# Patient Record
Sex: FEMALE
[De-identification: names, ages, dates, MRNs, and addresses within clinical notes are randomized per-mention and may not be internally consistent; named-entity substitution may affect disease eponyms.]

---

## 2017-12-20 PROBLEM — Z00.00 ENCOUNTER FOR PREVENTIVE HEALTH EXAMINATION: Status: ACTIVE | Noted: 2017-12-20

## 2018-07-26 PROBLEM — Z87.81 HISTORY OF FRACTURE OF WRIST: Status: RESOLVED | Noted: 2018-07-26 | Resolved: 2018-07-26

## 2018-07-26 PROBLEM — Z86.69 HISTORY OF BELL'S PALSY: Status: RESOLVED | Noted: 2018-07-26 | Resolved: 2018-07-26

## 2018-07-26 PROBLEM — M85.80 OSTEOPENIA: Status: ACTIVE | Noted: 2018-07-26

## 2018-07-26 PROBLEM — F40.00 HISTORY OF AGORAPHOBIA: Status: ACTIVE | Noted: 2018-07-26

## 2018-07-26 PROBLEM — E03.9 HYPOTHYROIDISM: Status: ACTIVE | Noted: 2018-07-26

## 2018-07-26 PROBLEM — F80.81 STUTTERING: Status: ACTIVE | Noted: 2018-07-26

## 2018-07-26 PROBLEM — E78.5 HYPERLIPIDEMIA: Status: ACTIVE | Noted: 2018-07-26

## 2018-07-26 PROBLEM — Z83.79 FAMILY HISTORY OF GASTROESOPHAGEAL REFLUX DISEASE: Status: ACTIVE | Noted: 2018-07-26

## 2018-07-26 PROBLEM — F25.9 SCHIZOAFFECTIVE DISORDER: Status: ACTIVE | Noted: 2018-07-26

## 2018-07-26 PROBLEM — Z82.3 FAMILY HISTORY OF CEREBROVASCULAR ACCIDENT (CVA): Status: ACTIVE | Noted: 2018-07-26

## 2018-07-26 PROBLEM — Z82.5 FAMILY HISTORY OF ASTHMA: Status: ACTIVE | Noted: 2018-07-26

## 2018-07-30 ENCOUNTER — APPOINTMENT (OUTPATIENT)
Dept: GASTROENTEROLOGY | Facility: CLINIC | Age: 68
End: 2018-07-30
Payer: MEDICARE

## 2018-07-30 VITALS
OXYGEN SATURATION: 98 % | WEIGHT: 138.5 LBS | BODY MASS INDEX: 23.64 KG/M2 | SYSTOLIC BLOOD PRESSURE: 102 MMHG | HEART RATE: 71 BPM | HEIGHT: 64 IN | DIASTOLIC BLOOD PRESSURE: 70 MMHG | TEMPERATURE: 97.7 F

## 2018-07-30 DIAGNOSIS — E78.5 HYPERLIPIDEMIA, UNSPECIFIED: ICD-10-CM

## 2018-07-30 DIAGNOSIS — M85.80 OTHER SPECIFIED DISORDERS OF BONE DENSITY AND STRUCTURE, UNSPECIFIED SITE: ICD-10-CM

## 2018-07-30 DIAGNOSIS — Z78.9 OTHER SPECIFIED HEALTH STATUS: ICD-10-CM

## 2018-07-30 DIAGNOSIS — Z12.11 ENCOUNTER FOR SCREENING FOR MALIGNANT NEOPLASM OF COLON: ICD-10-CM

## 2018-07-30 DIAGNOSIS — Z86.69 PERSONAL HISTORY OF OTHER DISEASES OF THE NERVOUS SYSTEM AND SENSE ORGANS: ICD-10-CM

## 2018-07-30 DIAGNOSIS — F40.00 AGORAPHOBIA, UNSPECIFIED: ICD-10-CM

## 2018-07-30 DIAGNOSIS — Z82.5 FAMILY HISTORY OF ASTHMA AND OTHER CHRONIC LOWER RESPIRATORY DISEASES: ICD-10-CM

## 2018-07-30 DIAGNOSIS — Z83.79 FAMILY HISTORY OF OTHER DISEASES OF THE DIGESTIVE SYSTEM: ICD-10-CM

## 2018-07-30 DIAGNOSIS — F25.9 SCHIZOAFFECTIVE DISORDER, UNSPECIFIED: ICD-10-CM

## 2018-07-30 DIAGNOSIS — E03.9 HYPOTHYROIDISM, UNSPECIFIED: ICD-10-CM

## 2018-07-30 DIAGNOSIS — Z87.81 PERSONAL HISTORY OF (HEALED) TRAUMATIC FRACTURE: ICD-10-CM

## 2018-07-30 DIAGNOSIS — Z82.3 FAMILY HISTORY OF STROKE: ICD-10-CM

## 2018-07-30 DIAGNOSIS — F80.81 CHILDHOOD ONSET FLUENCY DISORDER: ICD-10-CM

## 2018-07-30 PROCEDURE — 99214 OFFICE O/P EST MOD 30 MIN: CPT

## 2018-07-30 RX ORDER — PRAVASTATIN SODIUM 40 MG/1
40 TABLET ORAL DAILY
Refills: 0 | Status: ACTIVE | COMMUNITY

## 2018-07-30 RX ORDER — FLUPHENAZINE HYDROCHLORIDE 2.5 MG/1
2.5 TABLET, FILM COATED ORAL DAILY
Refills: 0 | Status: ACTIVE | COMMUNITY

## 2018-07-30 RX ORDER — LACTOBACILLUS RHAMNOSUS GG 10B CELL
CAPSULE ORAL DAILY
Refills: 0 | Status: ACTIVE | COMMUNITY

## 2018-07-30 RX ORDER — LEVOTHYROXINE SODIUM 0.07 MG/1
75 TABLET ORAL DAILY
Refills: 0 | Status: ACTIVE | COMMUNITY

## 2018-07-30 RX ORDER — BENZTROPINE MESYLATE 1 MG/1
1 TABLET ORAL DAILY
Refills: 0 | Status: ACTIVE | COMMUNITY

## 2018-07-30 RX ORDER — FLUOXETINE HYDROCHLORIDE 20 MG/1
20 CAPSULE ORAL
Qty: 90 | Refills: 0 | Status: ACTIVE | COMMUNITY

## 2019-02-04 ENCOUNTER — APPOINTMENT (OUTPATIENT)
Dept: GASTROENTEROLOGY | Facility: CLINIC | Age: 69
End: 2019-02-04

## 2019-03-04 ENCOUNTER — APPOINTMENT (OUTPATIENT)
Dept: GASTROENTEROLOGY | Facility: CLINIC | Age: 69
End: 2019-03-04
Payer: MEDICARE

## 2019-03-04 VITALS
OXYGEN SATURATION: 96 % | WEIGHT: 130 LBS | TEMPERATURE: 98.5 F | HEART RATE: 98 BPM | HEIGHT: 64 IN | RESPIRATION RATE: 15 BRPM | BODY MASS INDEX: 22.2 KG/M2 | DIASTOLIC BLOOD PRESSURE: 62 MMHG | SYSTOLIC BLOOD PRESSURE: 120 MMHG

## 2019-03-04 PROCEDURE — 99213 OFFICE O/P EST LOW 20 MIN: CPT

## 2019-03-04 RX ORDER — LORAZEPAM 1 MG/1
1 TABLET ORAL
Refills: 0 | Status: ACTIVE | COMMUNITY
Start: 2019-03-04

## 2019-03-04 NOTE — PHYSICAL EXAM
[General Appearance - Alert] : alert [General Appearance - In No Acute Distress] : in no acute distress [Sclera] : the sclera and conjunctiva were normal [Neck Appearance] : the appearance of the neck was normal [Respiration, Rhythm And Depth] : normal respiratory rhythm and effort [Exaggerated Use Of Accessory Muscles For Inspiration] : no accessory muscle use [Auscultation Breath Sounds / Voice Sounds] : lungs were clear to auscultation bilaterally [Heart Rate And Rhythm] : heart rate was normal and rhythm regular [Heart Sounds] : normal S1 and S2 [Murmurs] : no murmurs [Edema] : there was no peripheral edema [Abdomen Soft] : soft [Abdomen Tenderness] : non-tender [Abdomen Mass (___ Cm)] : no abdominal mass palpated [No CVA Tenderness] : no ~M costovertebral angle tenderness [Abnormal Walk] : normal gait [Involuntary Movements] : no involuntary movements were seen [] : no rash [No Focal Deficits] : no focal deficits [Oriented To Time, Place, And Person] : oriented to person, place, and time [Affect] : the affect was normal [Mood] : the mood was normal

## 2019-03-04 NOTE — ASSESSMENT
[FreeTextEntry1] : Impression:\par 1. IBS with diarrhea predominant symptoms - currently well controlled on low FODMAP diet, IBgard and Culturelle\par \par Recommend:\par - Continue low FODMAP diet and current regiment\par - Continue lactose free diet\par - Patient is asking if she can try to go down on some of her medications; Since her symptoms are predominantly with wheat and she is doing well, I advised her she can continue the low FODMAP diet and try to slowly wean off of IBgard, but we would do this with only one medication at a time\par - colonoscopy is up to date (last performed 2017)\par \par RTC in 6 months

## 2019-03-04 NOTE — HISTORY OF PRESENT ILLNESS
[_________] : Performed [unfilled] [Heartburn] : denies heartburn [Nausea] : denies nausea [Vomiting] : denies vomiting [Diarrhea] : denies diarrhea [Constipation] : denies constipation [Yellow Skin Or Eyes (Jaundice)] : denies jaundice [Abdominal Pain] : denies abdominal pain [Abdominal Swelling] : denies abdominal swelling [Rectal Pain] : denies rectal pain [de-identified] : This is a 68 year old female with IBS-D, who presents for follow-up of IBS-D.\par \par The patient is a previous patient of Dr. Bhavin Gómez. She was last seen in July of 2018 by Dr. Gómez. She was treated with low FODMAP diet, culturelle and IBGard. She reports feeling great on this regiment. She tries to avoid wheat and limits it only to once a day. She now has about 2-3 bowel movements a week. She denies any abdominal pain, bloating on this current regiment. She denies any dysphagia, odynophagia. Her stools are without melena or hematochezia. Since cutting out a lot of wheat, she has lost weight, but reports it being stable in the 120's-130's now.\par  \par Lactose breath test 6/9/2017: Lactose intolerance\par \par Stool 1/16/17: Negative culture, Cdiff, O&P, giardia, calprotectin, leukocytes, fecal fat QL, reducing substances\par \par 9/12/18:Na 137, K 4.3, Cl 97, Bicarb 27, Ca 9.7, ALP 64, AST 13, ALT 7, T protein 7.4, Albumin 4.5, T bili 0.3, TSH 0.12, A1C 5.5% [de-identified] : 2cm lipoma of splenic flexure. Sigmoid muscular hypertrophy moderate. No overt diverticulosis. Internal hemorrhoids. No polyps. Biopsies of sigmoid and ascending colon normal without microscopic colitis.

## 2019-08-05 ENCOUNTER — APPOINTMENT (OUTPATIENT)
Dept: GASTROENTEROLOGY | Facility: CLINIC | Age: 69
End: 2019-08-05
Payer: MEDICARE

## 2019-08-05 VITALS
RESPIRATION RATE: 17 BRPM | OXYGEN SATURATION: 97 % | DIASTOLIC BLOOD PRESSURE: 70 MMHG | HEIGHT: 64 IN | SYSTOLIC BLOOD PRESSURE: 110 MMHG | BODY MASS INDEX: 23.22 KG/M2 | WEIGHT: 136 LBS | HEART RATE: 76 BPM | TEMPERATURE: 97.3 F

## 2019-08-05 PROCEDURE — 99213 OFFICE O/P EST LOW 20 MIN: CPT

## 2019-08-05 NOTE — HISTORY OF PRESENT ILLNESS
[Heartburn] : denies heartburn [Nausea] : denies nausea [Vomiting] : denies vomiting [Diarrhea] : denies diarrhea [Constipation] : denies constipation [Yellow Skin Or Eyes (Jaundice)] : denies jaundice [Abdominal Pain] : denies abdominal pain [Abdominal Swelling] : denies abdominal swelling [_________] : Performed [unfilled] [Rectal Pain] : denies rectal pain [de-identified] : This is a 68 year old female with IBS-D, who presents for follow-up of IBS-D.\par \par Since the last visit, the patient has been doing well. She remains on Culturelle and IBGard with breakfast and dinner. She has had no abdominal pain or complaints. She reports daily bowel movements that are formed, no diarrhea, no blood/melena. She does not have any dysphagia, reflux, dyspepsia, weight loss. She has been able to eat a variety of foods without symptoms - for example she had oatmeal for breakfast, chili with fries for lunch and scrambled eggs for dinner and had no symptoms. She reports her nails has now more linear lines than usual, but otherwise it does not bother her.\par \par  \par Lactose breath test 6/9/2017: Lactose intolerance\par \par Stool 1/16/17: Negative culture, Cdiff, O&P, giardia, calprotectin, leukocytes, fecal fat QL, reducing substances\par \par 9/12/18:Na 137, K 4.3, Cl 97, Bicarb 27, Ca 9.7, ALP 64, AST 13, ALT 7, T protein 7.4, Albumin 4.5, T bili 0.3, TSH 0.12, A1C 5.5% [de-identified] : 2cm lipoma of splenic flexure. Sigmoid muscular hypertrophy moderate. No overt diverticulosis. Internal hemorrhoids. No polyps. Biopsies of sigmoid and ascending colon normal without microscopic colitis.

## 2019-08-05 NOTE — PHYSICAL EXAM
Cardiac testing ordered  bloodwork ordered  Go to ER if worsening [General Appearance - Alert] : alert [General Appearance - In No Acute Distress] : in no acute distress [Sclera] : the sclera and conjunctiva were normal [Neck Appearance] : the appearance of the neck was normal [Respiration, Rhythm And Depth] : normal respiratory rhythm and effort [Exaggerated Use Of Accessory Muscles For Inspiration] : no accessory muscle use [Auscultation Breath Sounds / Voice Sounds] : lungs were clear to auscultation bilaterally [Heart Rate And Rhythm] : heart rate was normal and rhythm regular [Heart Sounds] : normal S1 and S2 [Murmurs] : no murmurs [Edema] : there was no peripheral edema [Abdomen Soft] : soft [Abdomen Tenderness] : non-tender [Abdomen Mass (___ Cm)] : no abdominal mass palpated [No CVA Tenderness] : no ~M costovertebral angle tenderness [Abnormal Walk] : normal gait [Involuntary Movements] : no involuntary movements were seen [] : no rash [No Focal Deficits] : no focal deficits [Oriented To Time, Place, And Person] : oriented to person, place, and time [Affect] : the affect was normal [Mood] : the mood was normal [FreeTextEntry1] : Vertical ridges along nails without discolorations or pain

## 2020-02-10 ENCOUNTER — APPOINTMENT (OUTPATIENT)
Dept: GASTROENTEROLOGY | Facility: CLINIC | Age: 70
End: 2020-02-10

## 2020-03-02 ENCOUNTER — APPOINTMENT (OUTPATIENT)
Dept: GASTROENTEROLOGY | Facility: CLINIC | Age: 70
End: 2020-03-02
Payer: MEDICARE

## 2020-03-02 VITALS
WEIGHT: 137 LBS | SYSTOLIC BLOOD PRESSURE: 115 MMHG | HEART RATE: 61 BPM | OXYGEN SATURATION: 95 % | HEIGHT: 64 IN | DIASTOLIC BLOOD PRESSURE: 70 MMHG | BODY MASS INDEX: 23.39 KG/M2

## 2020-03-02 PROCEDURE — 99212 OFFICE O/P EST SF 10 MIN: CPT

## 2020-03-02 NOTE — PHYSICAL EXAM
[General Appearance - In No Acute Distress] : in no acute distress [General Appearance - Alert] : alert [Sclera] : the sclera and conjunctiva were normal [Neck Appearance] : the appearance of the neck was normal [Exaggerated Use Of Accessory Muscles For Inspiration] : no accessory muscle use [Respiration, Rhythm And Depth] : normal respiratory rhythm and effort [Auscultation Breath Sounds / Voice Sounds] : lungs were clear to auscultation bilaterally [Heart Rate And Rhythm] : heart rate was normal and rhythm regular [Heart Sounds] : normal S1 and S2 [Murmurs] : no murmurs [Edema] : there was no peripheral edema [Abdomen Soft] : soft [Abdomen Tenderness] : non-tender [Abdomen Mass (___ Cm)] : no abdominal mass palpated [Abnormal Walk] : normal gait [No CVA Tenderness] : no ~M costovertebral angle tenderness [Involuntary Movements] : no involuntary movements were seen [] : no rash [No Focal Deficits] : no focal deficits [Oriented To Time, Place, And Person] : oriented to person, place, and time [Affect] : the affect was normal [Mood] : the mood was normal

## 2020-03-02 NOTE — ASSESSMENT
[FreeTextEntry1] : Impression:\par 1. IBS with diarrhea predominant symptoms - currently well controlled on low FODMAP diet, IBgard and Culturelle\par \par Recommend:\par - Continue current regiment of Culturelle and IBgard\par - Continue lactose free diet\par - Can keep food diary if episodes of abdominal discomfort increase, or if she wants to see if she can tolerate certain foods over others\par - Up to date as colonoscopy without polyps were done in 2017 - she would be technically due for repeat in 2027 when she's over age 74, she has stated that she does not want to pursue screening past the age of 74\par \par RTC in 6-12 months\par \par \par RTC in 6 months

## 2020-09-14 ENCOUNTER — APPOINTMENT (OUTPATIENT)
Dept: GASTROENTEROLOGY | Facility: CLINIC | Age: 70
End: 2020-09-14
Payer: MEDICARE

## 2020-09-14 VITALS
BODY MASS INDEX: 21.77 KG/M2 | HEIGHT: 64 IN | TEMPERATURE: 98 F | SYSTOLIC BLOOD PRESSURE: 130 MMHG | DIASTOLIC BLOOD PRESSURE: 80 MMHG | WEIGHT: 127.5 LBS | HEART RATE: 87 BPM | OXYGEN SATURATION: 97 %

## 2020-09-14 DIAGNOSIS — E73.9 LACTOSE INTOLERANCE, UNSPECIFIED: ICD-10-CM

## 2020-09-14 DIAGNOSIS — K58.0 IRRITABLE BOWEL SYNDROME WITH DIARRHEA: ICD-10-CM

## 2020-09-14 PROCEDURE — 99213 OFFICE O/P EST LOW 20 MIN: CPT

## 2020-09-14 NOTE — PHYSICAL EXAM
[General Appearance - Alert] : alert [General Appearance - In No Acute Distress] : in no acute distress [Sclera] : the sclera and conjunctiva were normal [Neck Appearance] : the appearance of the neck was normal [Respiration, Rhythm And Depth] : normal respiratory rhythm and effort [Exaggerated Use Of Accessory Muscles For Inspiration] : no accessory muscle use [Auscultation Breath Sounds / Voice Sounds] : lungs were clear to auscultation bilaterally [Heart Rate And Rhythm] : heart rate was normal and rhythm regular [Heart Sounds] : normal S1 and S2 [Edema] : there was no peripheral edema [Murmurs] : no murmurs [Abdomen Soft] : soft [Abdomen Tenderness] : non-tender [Abdomen Mass (___ Cm)] : no abdominal mass palpated [No CVA Tenderness] : no ~M costovertebral angle tenderness [Abnormal Walk] : normal gait [Involuntary Movements] : no involuntary movements were seen [] : no rash [Oriented To Time, Place, And Person] : oriented to person, place, and time [No Focal Deficits] : no focal deficits [Affect] : the affect was normal [Mood] : the mood was normal

## 2020-09-14 NOTE — ASSESSMENT
[FreeTextEntry1] : Impression:\par 1. IBS with diarrhea predominant symptoms - currently well controlled on low FODMAP diet, IBgard and Culturelle\par \par Recommend:\par - Continue current regiment of Culturelle and IBgard\par - Continue lactose free diet\par - Advised her that if she wants to eat some FODMAP foods at times, she can, but just to note she might have diarrhea/pain afterwards\par - Up to date as colonoscopy without polyps were done in 2017 - she would be technically due for repeat in 2027 when she's over age 74, she has stated that she does not want to pursue screening past the age of 74\par - Asked patient to fax me over her labs with PMD given the weight loss\par - If weight loss continues and no lab changes are seen, might need repeat endoscopic evaluation\par \par RTC in 6 depending on labs and if weight stabilizes

## 2020-09-14 NOTE — HISTORY OF PRESENT ILLNESS
[Heartburn] : denies heartburn [Nausea] : denies nausea [Vomiting] : denies vomiting [Diarrhea] : denies diarrhea [Constipation] : denies constipation [Yellow Skin Or Eyes (Jaundice)] : denies jaundice [Abdominal Pain] : denies abdominal pain [Abdominal Swelling] : denies abdominal swelling [Rectal Pain] : denies rectal pain [Wt Loss ___ Lbs] : recent [unfilled] ~Upound(s) weight loss [_________] : Performed [unfilled] [de-identified] : This is a 68 year old female with IBS-D, who presents for follow-up of IBS-D.\par \par Since the last visit, the patient has been doing well. She remains on Culturelle and IBGard with breakfast and dinner. She had a bout of diarrhea and bloating pain after drinking iced coffee with almond milk for a 3 days, but the symptoms have now resolved after she stopped the ice coffee. She now has once daily bowel movements usually that are formed. She denies any melena or hematochezia. She has no trouble eating, dysphagia. She has very rare dyspepsia. She does have dairy sometimes and she will eat butter with bagel in the morning, hamberger with cheese for lunch. She has noticed some weight loss recently, and is going to her PMD tomorrow to check on her thyroid. She has not changed her activity level of diet recently.\par  \par Lactose breath test 6/9/2017: Lactose intolerance\par \par Stool 1/16/17: Negative culture, Cdiff, O&P, giardia, calprotectin, leukocytes, fecal fat QL, reducing substances\par \par 9/12/18:Na 137, K 4.3, Cl 97, Bicarb 27, Ca 9.7, ALP 64, AST 13, ALT 7, T protein 7.4, Albumin 4.5, T bili 0.3, TSH 0.12, A1C 5.5% [de-identified] : 2cm lipoma of splenic flexure. Sigmoid muscular hypertrophy moderate. No overt diverticulosis. Internal hemorrhoids. No polyps. Biopsies of sigmoid and ascending colon normal without microscopic colitis.

## 2020-09-14 NOTE — REVIEW OF SYSTEMS
[Fever] : no fever [Chills] : no chills [Recent Weight Loss (___ Lbs)] : recent [unfilled] ~Ulb weight loss [As Noted in HPI] : as noted in HPI [Negative] : Heme/Lymph
